# Patient Record
Sex: FEMALE | Race: WHITE | NOT HISPANIC OR LATINO | Employment: FULL TIME | URBAN - METROPOLITAN AREA
[De-identification: names, ages, dates, MRNs, and addresses within clinical notes are randomized per-mention and may not be internally consistent; named-entity substitution may affect disease eponyms.]

---

## 2019-04-15 ENCOUNTER — OFFICE VISIT (OUTPATIENT)
Dept: FAMILY MEDICINE CLINIC | Facility: CLINIC | Age: 24
End: 2019-04-15

## 2019-04-15 VITALS
BODY MASS INDEX: 21.53 KG/M2 | TEMPERATURE: 97.8 F | WEIGHT: 134 LBS | RESPIRATION RATE: 16 BRPM | HEART RATE: 98 BPM | OXYGEN SATURATION: 100 % | DIASTOLIC BLOOD PRESSURE: 74 MMHG | SYSTOLIC BLOOD PRESSURE: 112 MMHG | HEIGHT: 66 IN

## 2019-04-15 DIAGNOSIS — S80.12XD CONTUSION OF LEFT LOWER EXTREMITY, SUBSEQUENT ENCOUNTER: ICD-10-CM

## 2019-04-15 DIAGNOSIS — R23.3 EASY BRUISING: Primary | ICD-10-CM

## 2019-04-15 DIAGNOSIS — Z00.00 LABORATORY EXAMINATION ORDERED AS PART OF A ROUTINE GENERAL MEDICAL EXAMINATION: ICD-10-CM

## 2019-04-15 PROCEDURE — 99213 OFFICE O/P EST LOW 20 MIN: CPT | Performed by: PHYSICIAN ASSISTANT

## 2019-04-15 NOTE — PATIENT INSTRUCTIONS
Low glycemic index diet  Exercise 30 minutes most days of the week  Make sure you get results on any labs or tests we ordered today  We discussed medications and how to take them as prescribed  Sleep 6-8 hours each night if possible  If you have not signed up for [employer], please activate your code ASAP from your After Visit Summary today    LDL goal <100  LDL goal if heart disease <70  HDL goal >60  Triglyceride goal <150  BP goal =<130/80  Fasting glucose <100      Contusion  A contusion is a deep bruise. Contusions happen when an injury causes bleeding under the skin. Symptoms of bruising include pain, swelling, and discolored skin. The skin may turn blue, purple, or yellow.  Follow these instructions at home:  · Rest the injured area.  · If told, put ice on the injured area.  ? Put ice in a plastic bag.  ? Place a towel between your skin and the bag.  ? Leave the ice on for 20 minutes, 2-3 times per day.  · If told, put light pressure (compression) on the injured area using an elastic bandage. Make sure the bandage is not too tight. Remove it and put it back on as told by your doctor.  · If possible, raise (elevate) the injured area above the level of your heart while you are sitting or lying down.  · Take over-the-counter and prescription medicines only as told by your doctor.  Contact a doctor if:  · Your symptoms do not get better after several days of treatment.  · Your symptoms get worse.  · You have trouble moving the injured area.  Get help right away if:  · You have very bad pain.  · You have a loss of feeling (numbness) in a hand or foot.  · Your hand or foot turns pale or cold.  This information is not intended to replace advice given to you by your health care provider. Make sure you discuss any questions you have with your health care provider.  Document Released: 06/05/2009 Document Revised: 05/25/2017 Document Reviewed: 05/04/2016  Elsevier Interactive Patient Education © 2019 [employer]

## 2019-04-15 NOTE — PROGRESS NOTES
Subjective   Aniyah Medina is a 23 y.o. female.     History of Present Illness   Aniyah Medina 23 y.o. female presents today for Emergency Room follow up.  she was treated 3-26-19 for left leg bruising and lumps  .  I reviewed all of the labs and diagnostic testing and noted:  Venous doppler  The patient's medications were not changed:  Current outpatient and discharge medications have been reconciled for the patient.  Reviewed by: Gabrielle Dominguez PA-C    she does not have a follow up appointment with a specialist:     Not on ASA or NSAIDs  No other bleeding; no other contused areas;  Had contusions --those lumpy areas on calf; one more did occur with bruise day after ER; ;pain only one day; it is going away  She did not recall trauma. Not on OCP; no heavy periods; I will do labs  Had venous doppler 3-26-19---had 2 weeks prior to this onset    Conclusions: Left lower extremity with no evidence of deep or superficial vein thrombus.  No prior examination for comparison.  Preliminary technologist findings called to the Emergency Room physician Dr. Robles at 2150    Poor diet lately  No hx anemia  The following portions of the patient's history were reviewed and updated as appropriate: allergies, current medications, past family history, past medical history, past social history, past surgical history and problem list.    Review of Systems   Constitutional: Negative for activity change, appetite change and unexpected weight change.   HENT: Negative for nosebleeds and trouble swallowing.    Eyes: Negative for pain and visual disturbance.   Respiratory: Negative for chest tightness, shortness of breath and wheezing.    Cardiovascular: Negative for chest pain and palpitations.   Gastrointestinal: Negative for abdominal pain and blood in stool.   Endocrine: Negative.    Genitourinary: Negative for difficulty urinating and hematuria.   Musculoskeletal: Negative for joint swelling.   Skin: Negative for color change and rash.    Allergic/Immunologic: Negative.    Neurological: Negative for syncope and speech difficulty.   Hematological: Negative for adenopathy. Bruises/bleeds easily.   Psychiatric/Behavioral: Negative for agitation and confusion.   All other systems reviewed and are negative.      Objective   Physical Exam   Constitutional: She is oriented to person, place, and time. She appears well-developed and well-nourished. No distress.   HENT:   Head: Normocephalic and atraumatic.   Eyes: Conjunctivae and EOM are normal. Pupils are equal, round, and reactive to light. Right eye exhibits no discharge. Left eye exhibits no discharge. No scleral icterus.   Neck: Normal range of motion. Neck supple. No tracheal deviation present. No thyromegaly present.   Neg axillary, clavicular, groin   Cardiovascular: Normal rate, regular rhythm, normal heart sounds, intact distal pulses and normal pulses. Exam reveals no gallop.   No murmur heard.  Pulmonary/Chest: Effort normal and breath sounds normal. No respiratory distress. She has no wheezes. She has no rales.   Abdominal: Soft. Bowel sounds are normal. There is no tenderness.   No HSM   Musculoskeletal: Normal range of motion. She exhibits tenderness. She exhibits no edema or deformity.   Left calf 3 faint bruises with marble size nodules under skin; getting smaller; mild soreness   Lymphadenopathy:     She has no cervical adenopathy.   Neurological: She is alert and oriented to person, place, and time. She exhibits normal muscle tone. Coordination normal.   Skin: Skin is warm. No rash noted. No erythema. No pallor.   Psychiatric: She has a normal mood and affect. Her behavior is normal. Judgment and thought content normal.   Nursing note and vitals reviewed.      Assessment/Plan   Problems Addressed this Visit     None      Visit Diagnoses     Easy bruising    -  Primary    Relevant Orders    Comprehensive Metabolic Panel    TSH    T4, Free    T3, Free    Vitamin B12    Folate    Vitamin D  25 Hydroxy    Iron Profile    Ferritin    CBC & Differential    Contusion of left lower extremity, subsequent encounter        Relevant Orders    Comprehensive Metabolic Panel    TSH    T4, Free    T3, Free    Vitamin B12    Folate    Vitamin D 25 Hydroxy    Iron Profile    Ferritin    CBC & Differential    Laboratory examination ordered as part of a routine general medical examination        Relevant Orders    Comprehensive Metabolic Panel    TSH    T4, Free    T3, Free    Vitamin B12    Folate    Vitamin D 25 Hydroxy    Iron Profile    Ferritin    CBC & Differential        Watch if reoccurs  I will do labs d/u this unexplained bruising and deep contusions--rule out blood d/o and anemia; get iron def.

## 2019-09-11 ENCOUNTER — OFFICE VISIT (OUTPATIENT)
Dept: FAMILY MEDICINE CLINIC | Facility: CLINIC | Age: 24
End: 2019-09-11

## 2019-09-11 VITALS
DIASTOLIC BLOOD PRESSURE: 72 MMHG | BODY MASS INDEX: 21.38 KG/M2 | RESPIRATION RATE: 16 BRPM | SYSTOLIC BLOOD PRESSURE: 120 MMHG | OXYGEN SATURATION: 98 % | HEART RATE: 112 BPM | TEMPERATURE: 98.1 F | HEIGHT: 66 IN | WEIGHT: 133 LBS

## 2019-09-11 DIAGNOSIS — R23.3 EASY BRUISING: Primary | ICD-10-CM

## 2019-09-11 DIAGNOSIS — M25.649 STIFFNESS OF FINGER JOINT, UNSPECIFIED LATERALITY: ICD-10-CM

## 2019-09-11 DIAGNOSIS — Z82.61 FAMILY HISTORY OF RHEUMATOID ARTHRITIS: ICD-10-CM

## 2019-09-11 DIAGNOSIS — Z00.00 LABORATORY EXAMINATION ORDERED AS PART OF A ROUTINE GENERAL MEDICAL EXAMINATION: ICD-10-CM

## 2019-09-11 PROCEDURE — 99213 OFFICE O/P EST LOW 20 MIN: CPT | Performed by: PHYSICIAN ASSISTANT

## 2019-09-11 NOTE — PROGRESS NOTES
Ariana Medina is a 23 y.o. female.     History of Present Illness   No new fatigue, no SOA; has hx asthma  No weight loss and no night sweats  Not depressed; denies chronic anxiety    Sometimes stiff fingers; GM (p)--RA  Still some bruising but explained; few tiny capillary hemangiomas arms----I saw her mos ago and still want labs for this. Reprint order  No new bleeding; no unexplained bruising  The following portions of the patient's history were reviewed and updated as appropriate: allergies, current medications, past family history, past medical history, past social history, past surgical history and problem list.    Review of Systems   Constitutional: Negative for activity change, appetite change and unexpected weight change.   HENT: Negative for nosebleeds and trouble swallowing.    Eyes: Negative for pain and visual disturbance.   Respiratory: Negative for chest tightness, shortness of breath and wheezing.    Cardiovascular: Negative for chest pain and palpitations.   Gastrointestinal: Negative for abdominal pain and blood in stool.   Endocrine: Negative.    Genitourinary: Negative for difficulty urinating and hematuria.   Musculoskeletal: Negative for joint swelling.   Skin: Negative for color change and rash.   Allergic/Immunologic: Negative.    Neurological: Negative for syncope and speech difficulty.   Hematological: Negative for adenopathy.   Psychiatric/Behavioral: Negative for agitation and confusion.   All other systems reviewed and are negative.      Objective   Physical Exam   Constitutional: She is oriented to person, place, and time. She appears well-developed and well-nourished. No distress.   HENT:   Head: Normocephalic and atraumatic.   Eyes: Conjunctivae and EOM are normal. Pupils are equal, round, and reactive to light. Right eye exhibits no discharge. Left eye exhibits no discharge. No scleral icterus.   Neck: Normal range of motion. Neck supple. No tracheal deviation present. No  thyromegaly present.   Neg axillary, clavicular, groin   Cardiovascular: Normal rate, regular rhythm, normal heart sounds, intact distal pulses and normal pulses. Exam reveals no gallop.   No murmur heard.  Pulmonary/Chest: Effort normal and breath sounds normal. No respiratory distress. She has no wheezes. She has no rales.   Abdominal: Soft. Bowel sounds are normal. There is no tenderness.   No HSM   Musculoskeletal: Normal range of motion. She exhibits no edema, tenderness or deformity.   Left calf 3 faint bruises with marble size nodules under skin; getting smaller; mild soreness   Lymphadenopathy:     She has no cervical adenopathy.   Neurological: She is alert and oriented to person, place, and time. She exhibits normal muscle tone. Coordination normal.   Skin: Skin is warm. No rash noted. No erythema. No pallor.   Few tiny capillary hemangiomas on arms   Psychiatric: She has a normal mood and affect. Her behavior is normal. Judgment and thought content normal.   Nursing note and vitals reviewed.      Assessment/Plan   Problems Addressed this Visit     None      Visit Diagnoses     Easy bruising    -  Primary    Relevant Orders    Comprehensive metabolic panel    Lipid panel    CBC and Differential    TSH    T3, Free    Iron Profile    Ferritin    Vitamin D 25 Hydroxy    Vitamin B12    Folate    Protime-INR    APTT    Rheumatoid Factor    C-reactive Protein    MAMI    Laboratory examination ordered as part of a routine general medical examination        Relevant Orders    Comprehensive metabolic panel    Lipid panel    CBC and Differential    TSH    T3, Free    Iron Profile    Ferritin    Vitamin D 25 Hydroxy    Vitamin B12    Folate    Protime-INR    APTT    Rheumatoid Factor    C-reactive Protein    MAMI    Stiffness of finger joint, unspecified laterality        Relevant Orders    Comprehensive metabolic panel    Lipid panel    CBC and Differential    TSH    T3, Free    Iron Profile    Ferritin    Vitamin D  25 Hydroxy    Vitamin B12    Folate    Protime-INR    APTT    Rheumatoid Factor    C-reactive Protein    MAMI    Family history of rheumatoid arthritis        Relevant Orders    Comprehensive metabolic panel    Lipid panel    CBC and Differential    TSH    T3, Free    Iron Profile    Ferritin    Vitamin D 25 Hydroxy    Vitamin B12    Folate    Protime-INR    APTT    Rheumatoid Factor    C-reactive Protein    MAMI

## 2019-09-12 LAB
25(OH)D3+25(OH)D2 SERPL-MCNC: 22.6 NG/ML (ref 30–100)
ALBUMIN SERPL-MCNC: 4.7 G/DL (ref 3.5–5.2)
ALBUMIN/GLOB SERPL: 1.3 G/DL
ALP SERPL-CCNC: 79 U/L (ref 39–117)
ALT SERPL-CCNC: 13 U/L (ref 1–33)
ANA SER QL: NEGATIVE
APTT PPP: 32 SECONDS (ref 22.7–35.4)
AST SERPL-CCNC: 17 U/L (ref 1–32)
BASOPHILS # BLD AUTO: 0.04 10*3/MM3 (ref 0–0.2)
BASOPHILS NFR BLD AUTO: 0.7 % (ref 0–1.5)
BILIRUB SERPL-MCNC: 0.3 MG/DL (ref 0.2–1.2)
BUN SERPL-MCNC: 9 MG/DL (ref 6–20)
BUN/CREAT SERPL: 10.7 (ref 7–25)
CALCIUM SERPL-MCNC: 9.6 MG/DL (ref 8.6–10.5)
CHLORIDE SERPL-SCNC: 99 MMOL/L (ref 98–107)
CHOLEST SERPL-MCNC: 132 MG/DL (ref 0–200)
CO2 SERPL-SCNC: 25.5 MMOL/L (ref 22–29)
CREAT SERPL-MCNC: 0.84 MG/DL (ref 0.57–1)
CRP SERPL-MCNC: 0.17 MG/DL (ref 0–0.5)
EOSINOPHIL # BLD AUTO: 0.04 10*3/MM3 (ref 0–0.4)
EOSINOPHIL NFR BLD AUTO: 0.7 % (ref 0.3–6.2)
ERYTHROCYTE [DISTWIDTH] IN BLOOD BY AUTOMATED COUNT: 11.9 % (ref 12.3–15.4)
FERRITIN SERPL-MCNC: 44 NG/ML (ref 13–150)
FOLATE SERPL-MCNC: 7.6 NG/ML (ref 4.78–24.2)
GLOBULIN SER CALC-MCNC: 3.5 GM/DL
GLUCOSE SERPL-MCNC: 90 MG/DL (ref 65–99)
HCT VFR BLD AUTO: 45 % (ref 34–46.6)
HDLC SERPL-MCNC: 46 MG/DL (ref 40–60)
HGB BLD-MCNC: 15.1 G/DL (ref 12–15.9)
IMM GRANULOCYTES # BLD AUTO: 0.01 10*3/MM3 (ref 0–0.05)
IMM GRANULOCYTES NFR BLD AUTO: 0.2 % (ref 0–0.5)
INR PPP: 1.08 (ref 0.9–1.1)
IRON SATN MFR SERPL: 31 % (ref 20–50)
IRON SERPL-MCNC: 107 MCG/DL (ref 37–145)
LDLC SERPL CALC-MCNC: 75 MG/DL (ref 0–100)
LYMPHOCYTES # BLD AUTO: 1.82 10*3/MM3 (ref 0.7–3.1)
LYMPHOCYTES NFR BLD AUTO: 31.1 % (ref 19.6–45.3)
MCH RBC QN AUTO: 29.3 PG (ref 26.6–33)
MCHC RBC AUTO-ENTMCNC: 33.6 G/DL (ref 31.5–35.7)
MCV RBC AUTO: 87.2 FL (ref 79–97)
MONOCYTES # BLD AUTO: 0.47 10*3/MM3 (ref 0.1–0.9)
MONOCYTES NFR BLD AUTO: 8 % (ref 5–12)
NEUTROPHILS # BLD AUTO: 3.48 10*3/MM3 (ref 1.7–7)
NEUTROPHILS NFR BLD AUTO: 59.3 % (ref 42.7–76)
NRBC BLD AUTO-RTO: 0 /100 WBC (ref 0–0.2)
PLATELET # BLD AUTO: 176 10*3/MM3 (ref 140–450)
POTASSIUM SERPL-SCNC: 4.2 MMOL/L (ref 3.5–5.2)
PROT SERPL-MCNC: 8.2 G/DL (ref 6–8.5)
PROTHROMBIN TIME: 13.7 SECONDS (ref 11.7–14.2)
RBC # BLD AUTO: 5.16 10*6/MM3 (ref 3.77–5.28)
RHEUMATOID FACT SERPL-ACNC: <10 IU/ML (ref 0–13.9)
SODIUM SERPL-SCNC: 139 MMOL/L (ref 136–145)
T3FREE SERPL-MCNC: 3.5 PG/ML (ref 2–4.4)
TIBC SERPL-MCNC: 341 MCG/DL
TRIGL SERPL-MCNC: 53 MG/DL (ref 0–150)
TSH SERPL DL<=0.005 MIU/L-ACNC: 1.74 UIU/ML (ref 0.27–4.2)
UIBC SERPL-MCNC: 234 MCG/DL (ref 112–346)
VIT B12 SERPL-MCNC: 582 PG/ML (ref 211–946)
VLDLC SERPL CALC-MCNC: 10.6 MG/DL
WBC # BLD AUTO: 5.86 10*3/MM3 (ref 3.4–10.8)

## 2019-09-15 DIAGNOSIS — R23.3 EASY BRUISING: Primary | ICD-10-CM

## 2021-02-04 RX ORDER — EPINEPHRINE 0.3 MG/.3ML
0.3 INJECTION SUBCUTANEOUS
COMMUNITY
Start: 2019-12-06 | End: 2021-02-04 | Stop reason: SDUPTHER

## 2021-02-04 RX ORDER — EPINEPHRINE 0.3 MG/.3ML
0.3 INJECTION SUBCUTANEOUS ONCE
Qty: 1 EACH | Refills: 1 | Status: SHIPPED | OUTPATIENT
Start: 2021-02-04 | End: 2021-02-04

## 2021-02-04 NOTE — TELEPHONE ENCOUNTER
Caller: Aniyah Medina    Relationship: Self    Best call back number: [phone]    Medication needed:   EPI PEN     When do you need the refill by: 2-5-21    What details did the patient provide when requesting the medication: MEDICATION EXPIRES NEXT MONTH    Does the patient have less than a 3 day supply:  [] Yes  [x] No    What is the patient's preferred pharmacy: [id number] [address] AT [facility] [phone] [facility] [phone]

## 2021-04-16 ENCOUNTER — BULK ORDERING (OUTPATIENT)
Dept: CASE MANAGEMENT | Facility: OTHER | Age: 26
End: 2021-04-16

## 2021-04-16 DIAGNOSIS — Z23 IMMUNIZATION DUE: ICD-10-CM
